# Patient Record
Sex: MALE | Race: OTHER
[De-identification: names, ages, dates, MRNs, and addresses within clinical notes are randomized per-mention and may not be internally consistent; named-entity substitution may affect disease eponyms.]

---

## 2018-07-25 ENCOUNTER — HOSPITAL ENCOUNTER (EMERGENCY)
Dept: HOSPITAL 62 - ER | Age: 63
Discharge: HOME | End: 2018-07-25
Payer: MEDICARE

## 2018-07-25 VITALS — DIASTOLIC BLOOD PRESSURE: 91 MMHG | SYSTOLIC BLOOD PRESSURE: 143 MMHG

## 2018-07-25 DIAGNOSIS — Z98.890: ICD-10-CM

## 2018-07-25 DIAGNOSIS — Z86.14: ICD-10-CM
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
## 2018-07-25 DIAGNOSIS — M25.561: ICD-10-CM

## 2018-07-25 DIAGNOSIS — S72.491A: Primary | ICD-10-CM
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
## 2018-07-25 LAB
ADD MANUAL DIFF: NO
ANION GAP SERPL CALC-SCNC: 13 MMOL/L (ref 5–19)
BASOPHILS # BLD AUTO: 0 10^3/UL (ref 0–0.2)
BASOPHILS NFR BLD AUTO: 0.1 % (ref 0–2)
BUN SERPL-MCNC: 14 MG/DL (ref 7–20)
CALCIUM: 9.6 MG/DL (ref 8.4–10.2)
CHLORIDE SERPL-SCNC: 102 MMOL/L (ref 98–107)
CO2 SERPL-SCNC: 26 MMOL/L (ref 22–30)
CRP SERPL-MCNC: 8 MG/L (ref ?–10)
EOSINOPHIL # BLD AUTO: 0 10^3/UL (ref 0–0.6)
EOSINOPHIL NFR BLD AUTO: 0.3 % (ref 0–6)
ERYTHROCYTE [DISTWIDTH] IN BLOOD BY AUTOMATED COUNT: 13.4 % (ref 11.5–14)
ERYTHROCYTE [SEDIMENTATION RATE] IN BLOOD: 9 MM/HR (ref 0–20)
GLUCOSE SERPL-MCNC: 105 MG/DL (ref 75–110)
HCT VFR BLD CALC: 41.4 % (ref 37.9–51)
HGB BLD-MCNC: 14 G/DL (ref 13.5–17)
LYMPHOCYTES # BLD AUTO: 0.7 10^3/UL (ref 0.5–4.7)
LYMPHOCYTES NFR BLD AUTO: 5.9 % (ref 13–45)
MCH RBC QN AUTO: 30.6 PG (ref 27–33.4)
MCHC RBC AUTO-ENTMCNC: 33.9 G/DL (ref 32–36)
MCV RBC AUTO: 90 FL (ref 80–97)
MONOCYTES # BLD AUTO: 0.4 10^3/UL (ref 0.1–1.4)
MONOCYTES NFR BLD AUTO: 3.5 % (ref 3–13)
NEUTROPHILS # BLD AUTO: 10.9 10^3/UL (ref 1.7–8.2)
NEUTS SEG NFR BLD AUTO: 90.2 % (ref 42–78)
PLATELET # BLD: 250 10^3/UL (ref 150–450)
POTASSIUM SERPL-SCNC: 4.9 MMOL/L (ref 3.6–5)
RBC # BLD AUTO: 4.58 10^6/UL (ref 4.35–5.55)
SODIUM SERPL-SCNC: 141 MMOL/L (ref 137–145)
TOTAL CELLS COUNTED % (AUTO): 100 %
WBC # BLD AUTO: 12.1 10^3/UL (ref 4–10.5)

## 2018-07-25 PROCEDURE — 87040 BLOOD CULTURE FOR BACTERIA: CPT

## 2018-07-25 PROCEDURE — 80048 BASIC METABOLIC PNL TOTAL CA: CPT

## 2018-07-25 PROCEDURE — 96375 TX/PRO/DX INJ NEW DRUG ADDON: CPT

## 2018-07-25 PROCEDURE — 99283 EMERGENCY DEPT VISIT LOW MDM: CPT

## 2018-07-25 PROCEDURE — 73564 X-RAY EXAM KNEE 4 OR MORE: CPT

## 2018-07-25 PROCEDURE — 36415 COLL VENOUS BLD VENIPUNCTURE: CPT

## 2018-07-25 PROCEDURE — 85652 RBC SED RATE AUTOMATED: CPT

## 2018-07-25 PROCEDURE — 96374 THER/PROPH/DIAG INJ IV PUSH: CPT

## 2018-07-25 PROCEDURE — 85025 COMPLETE CBC W/AUTO DIFF WBC: CPT

## 2018-07-25 PROCEDURE — 86140 C-REACTIVE PROTEIN: CPT

## 2018-07-25 NOTE — ER DOCUMENT REPORT
ED Extremity Problem, Lower





- General


Chief Complaint: Knee Pain


Stated Complaint: FALL INJURY


Time Seen by Provider: 07/25/18 15:23


Mode of Arrival: Stretcher


Information source: Patient


TRAVEL OUTSIDE OF THE [place] IN LAST 30 DAYS: No





- HPI


Patient complains to provider of: Injury, Pain, Swelling


Location: Knee


Occurred: Just prior to arrival


Where: Outdoors


Onset/Duration: Sudden


Quality of pain: Achy, Fullness, Pressure


Severity: Moderate


Pain Level: 4


Context: Fell


Recent injury: Yes


Associated symptoms: [place] a pop


Exacerbated by: Movement


Relieved by: Nothing


Notes: 





Patient is a 63-year-old male with a history of previous right-sided tibial 

plateau fracture in 2016 with resultant complications and multiple follow-up 

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 

joint, presenting to the emergency room today after slip and fall resulting in 

injury to the right knee once again, he reports pain with range of motion 

testing, inability to ambulate, felt a pop at the time of the injury, denies 

pain or injury elsewhere, distal sensation and motor is intact





- Related Data


Allergies/Adverse Reactions: 


 





No Known Allergies Allergy (Verified 07/25/18 14:57)


 











Past Medical History





- General


Information source: Patient





- Social History


Smoking Status: Unknown if Ever Smoked


Family History: Reviewed & Not Pertinent





- Immunizations


Hx Diphtheria, Pertussis, Tetanus Vaccination: No





Review of Systems





- Review of Systems


Constitutional: No symptoms reported


EENT: No symptoms reported


Cardiovascular: No symptoms reported


Respiratory: No symptoms reported


Gastrointestinal: No symptoms reported


Genitourinary: No symptoms reported


Male Genitourinary: No symptoms reported


Musculoskeletal: See HPI


Skin: No symptoms reported


Hematologic/Lymphatic: No symptoms reported


Neurological/Psychological: No symptoms reported


-: Yes All other systems reviewed and negative





Physical Exam





- Vital signs


Vitals: 


 











Temp Pulse Resp BP Pulse Ox


 


 97.7 F   81   16   143/91 H  95 


 


 07/25/18 15:14  07/25/18 15:14  07/25/18 15:14  07/25/18 15:14  07/25/18 15:14














- Notes


Notes: 





- General


General appearance: Appears well, Alert


In distress: None





- HEENT


Head: Normocephalic, Atraumatic


Eyes: Normal


Conjunctiva: Normal


Extraocular movements intact: Yes


Eyelashes: Normal


Pupils: PERRL





- Respiratory


Respiratory status: No respiratory distress





- Cardiovascular


Rhythm: Regular





- Abdominal


Inspection: Normal





- Back


Back: Normal





- Extremities


General upper extremity: Normal inspection


General lower extremity: Right knee with several surgical scars which are well 

healed, swelling to the knee anteriorly and proximally, with tenderness at the 

distal lateral femur, distal sensation and motor is intact with 2+ DP pulses





- Neurological


Neuro grossly intact: Yes


Orientation: AAOx4


Michael Coma Scale Eye Opening: Spontaneous


Michael Coma Scale Verbal: Oriented


Michael Coma Scale Motor: Obeys Commands


Michael Coma Scale Total: 15





- Psychological


Associated symptoms: Normal affect, Normal mood





- Skin


Skin Temperature: Warm


Skin Moisture: Dry


Skin Color: Normal





Course





- Re-evaluation


Re-evalutation: 





07/25/18 16:35


I placed a call to [facility] at patient's request at [phone], as Dr. aPul Gonzalez is treated patient in the past, I left a message requesting a 

return call





I also placed a call to our on-call orthopedist, Dr. Bassem Velasquez who reviewed 

patient's x-rays, states that immobilization and close outpatient follow-up is 

likely the safest plan of action for this patient given his complicated history 

with previous MRSA infection, he does request that patient have some basic labs 

including a sed rate and CRP performed prior to leaving in case he does require 

surgery in the future, recommend patient continue wearing the brace he is 

wearing at this point in time, remain nonweightbearing, and follow-up 

appropriately





This plan was discussed with patient and wife at bedside who are in agreement, 

patient states that he does prefer to follow-up with Dr. Gonzalez but he has not 

opposed to following up with Dr. Velasquez as well, therefore patient was given 

Dr. Velasquez's information, labs were drawn prior to discharge, he will be given 

a prescription for pain medication as well as instructions for follow-up and 

advised to return if anything worsens, patient acknowledges understanding and 

agreement with this plan





- Vital Signs


Vital signs: 


 











Temp Pulse Resp BP Pulse Ox


 


 97.7 F   81   16   143/91 H  95 


 


 07/25/18 15:14  07/25/18 15:14  07/25/18 15:14  07/25/18 15:14  07/25/18 15:14














- Diagnostic Test


Radiology reviewed: Image reviewed, Reports reviewed





Discharge





- Discharge


Clinical Impression: 


Fracture, femur, distal


Qualifiers:


 Encounter type: initial encounter Fracture type: closed Fracture morphology: 

unspecified fracture morphology Laterality: right Qualified Code(s): S72.401A - 

Unspecified fracture of lower end of right femur, initial encounter for closed 

fracture





Condition: Stable


Disposition: HOME, SELF-CARE


Instructions:  Use of Crutches ([facility]), Ice & Elevation ([facility]), Oral Narcotic 

Medication ([facility]), Fracture ([facility])


Additional Instructions: 


Follow up with your primary care provider and an orthopedic surgeon in one to 2 

days.  Return to the emergency room immediately if symptoms worsen or any 

additional concerns.  Ice and elevate the affected extremity.  Remain non-

weightbearing.


Prescriptions: 


Oxycodone HCl [Oxycontin Sr 10 mg Tablet] 10 mg PO Q12 #10 tab.sr.[id number]


Referrals: 


JOVANI SALINAS DO [Emergency Provider] - Follow up as needed


BASSEM VELASQUEZ MD [ACTIVE STAFF] - Follow up as needed

## 2018-07-25 NOTE — RADIOLOGY REPORT (SQ)
EXAM DESCRIPTION:  KNEE RIGHT 4 VIEWS



COMPLETED DATE/TIME:  7/25/2018 3:35 pm



REASON FOR STUDY:  fall injury



COMPARISON:  None.



NUMBER OF VIEWS:  Two views.



TECHNIQUE:  AP and lateral radiographic images acquired of the right knee.



LIMITATIONS:  None.



FINDINGS:  MINERALIZATION: Osteopenia.

BONES: A moderately angulated transverse fracture is seen of the distal femoral metaphysis.  Postsurg
ical changes are seen of the proximal tibia.

JOINT: No effusion.

SOFT TISSUES: No soft tissue swelling.  No radio-opaque foreign body.

OTHER: No other significant finding.



IMPRESSION:  Moderately angulated transverse fracture of the distal femoral metaphysis.  Background o
f proximal tibial surgical changes.



TECHNICAL DOCUMENTATION:  JOB ID:  [id number]

 2011 [employer]- All Rights Reserved



Reading location - IP/workstation name: [email or web address]